# Patient Record
Sex: FEMALE | Race: WHITE | NOT HISPANIC OR LATINO | Employment: UNEMPLOYED | URBAN - METROPOLITAN AREA
[De-identification: names, ages, dates, MRNs, and addresses within clinical notes are randomized per-mention and may not be internally consistent; named-entity substitution may affect disease eponyms.]

---

## 2017-11-15 ENCOUNTER — TELEPHONE (OUTPATIENT)
Dept: TRANSPLANT | Facility: CLINIC | Age: 57
End: 2017-11-15

## 2019-08-14 PROBLEM — J44.9 COPD, GROUP D, BY GOLD 2017 CLASSIFICATION: Status: ACTIVE | Noted: 2019-08-14

## 2019-08-14 PROBLEM — J43.2 CENTRILOBULAR EMPHYSEMA: Status: ACTIVE | Noted: 2019-08-14

## 2019-08-14 PROBLEM — Z87.891 HISTORY OF TOBACCO ABUSE: Status: ACTIVE | Noted: 2019-08-14

## 2022-07-26 PROBLEM — J96.11 CHRONIC RESPIRATORY FAILURE WITH HYPOXIA: Status: ACTIVE | Noted: 2022-07-26

## 2022-07-26 PROBLEM — R91.1 LUNG NODULE: Status: ACTIVE | Noted: 2022-07-26

## 2022-07-26 PROBLEM — Z80.9 FAMILY HISTORY OF CANCER: Status: ACTIVE | Noted: 2022-07-26

## 2022-07-26 PROBLEM — Z87.891 FORMER SMOKER: Status: ACTIVE | Noted: 2022-07-26

## 2024-03-01 PROBLEM — F32.9 MDD (MAJOR DEPRESSIVE DISORDER): Status: ACTIVE | Noted: 2024-03-01

## 2024-03-01 PROBLEM — J12.82 PNEUMONIA DUE TO COVID-19 VIRUS: Status: ACTIVE | Noted: 2024-03-01

## 2024-03-01 PROBLEM — R10.9 FLANK PAIN: Status: ACTIVE | Noted: 2024-03-01

## 2024-03-01 PROBLEM — U07.1 PNEUMONIA DUE TO COVID-19 VIRUS: Status: ACTIVE | Noted: 2024-03-01

## 2024-03-01 PROBLEM — J96.21 ACUTE ON CHRONIC HYPOXIC RESPIRATORY FAILURE: Status: ACTIVE | Noted: 2024-03-01

## 2024-03-07 DIAGNOSIS — U07.1 COVID-19 VIRUS DETECTED: ICD-10-CM

## 2024-03-07 PROBLEM — J96.21 ACUTE ON CHRONIC HYPOXIC RESPIRATORY FAILURE: Status: RESOLVED | Noted: 2024-03-01 | Resolved: 2024-03-07

## 2024-03-12 ENCOUNTER — NURSE TRIAGE (OUTPATIENT)
Dept: ADMINISTRATIVE | Facility: CLINIC | Age: 64
End: 2024-03-12

## 2024-03-12 NOTE — TELEPHONE ENCOUNTER
Pt co pain to back,buttocks and legs onset three weeks ago. Pt reports she was recently hospitalized for Covid/Dehydration for a week wanted to send her to rehab but she wanted to go home instead. Pt states her breathing is ok but reports pain to lower extremities with any movements she  states no improvement in pain reports breathing has improved denies chest pain but states generalized pain has not and wants something for the pain.   Reason for Disposition   Condition / symptoms SAME (not improving)    Additional Information   Negative: Sounds like a life-threatening emergency to the triager   Negative: Patient sounds very sick or weak to the triager   Negative: Sounds like a serious complication to the triager   Negative: Condition / symptoms WORSE   Negative: Caller has URGENT question and triager unable to answer question   Negative: Patient wants to be seen    Protocols used: Post-Hospitalization Follow-up Call-A-[facility]

## 2024-03-14 ENCOUNTER — NURSE TRIAGE (OUTPATIENT)
Dept: ADMINISTRATIVE | Facility: CLINIC | Age: 64
End: 2024-03-14

## 2024-03-14 NOTE — TELEPHONE ENCOUNTER
[place]    PCP:  Dr. Alex Maria - see will see Dr. Harris on Monday until she can see her PCP, Dr. Maria    Pt responded to Covid [facility] message for new or worsening symptoms.  States in pain.  H/O end stage COPD.  She had Flu/Covid.  C/O ongoing severe lower back/leg/buttock pain rated 9-10/10, intermittent mild CP, fall yesterday (elbow injured - abrasion), abdominal pain, and moderate SOB with activity (sat 84-88% with activity).  Denies fever, unable to move elbow, elbow crooked/deformed, and bleeding.  She states O2 line got tangled in the refrigerator door and caused her to fall.  She is on home O2 at 3.5 L per NC.  SpO2: 90% MA: 101.  Recheck after deep breathing and coughing SpO2: 90% MA: 123.  Recheck on different finger SpO2: 89% MA: 126.  Per protocol, care advised is go to ED now.  Pt VU and states she will go if she can get there.  Advised if unable to go to the ED by private transportation then she needs to call  for transport to the hospital.  Instructed to call OOC with worsening of symptoms and/or questions/concerns.  Verbalizes understanding.    Reason for Disposition   Abdominal pain and age > 60 years   MODERATE difficulty breathing (e.g., speaks in phrases, SOB even at rest, pulse 100-120)    Additional Information   Negative: Passed out (i.e., fainted, collapsed and was not responding)   Negative: Shock suspected (e.g., cold/pale/clammy skin, too weak to stand, low BP, rapid pulse)   Negative: Sounds like a life-threatening emergency to the triager   Negative: SEVERE back pain of sudden onset and age > 60 years   Negative: SEVERE abdominal pain (e.g., excruciating)   Negative: SEVERE difficulty breathing (e.g., struggling for each breath, speaks in single words)   Negative: Difficult to awaken or acting confused (e.g., disoriented, slurred speech)   Negative: Bluish (or gray) lips or face now   Negative: Shock suspected (e.g., cold/pale/clammy skin, too weak to stand, low BP, rapid pulse)    Negative: Sounds like a life-threatening emergency to the triager   Negative: SEVERE or constant chest pain or pressure  (Exception: Mild central chest pain, present only when coughing.)    Protocols used: Back Pain-A-[place], Coronavirus (COVID-19) Diagnosed or [id number]

## 2024-06-03 PROBLEM — U07.1 PNEUMONIA DUE TO COVID-19 VIRUS: Status: RESOLVED | Noted: 2024-03-01 | Resolved: 2024-06-03

## 2024-06-03 PROBLEM — J12.82 PNEUMONIA DUE TO COVID-19 VIRUS: Status: RESOLVED | Noted: 2024-03-01 | Resolved: 2024-06-03

## 2024-09-27 DIAGNOSIS — Z12.31 OTHER SCREENING MAMMOGRAM: ICD-10-CM
